# Patient Record
Sex: MALE | Race: WHITE | NOT HISPANIC OR LATINO
[De-identification: names, ages, dates, MRNs, and addresses within clinical notes are randomized per-mention and may not be internally consistent; named-entity substitution may affect disease eponyms.]

---

## 2019-08-21 ENCOUNTER — RECORD ABSTRACTING (OUTPATIENT)
Age: 39
End: 2019-08-21

## 2019-08-21 DIAGNOSIS — Z81.8 FAMILY HISTORY OF OTHER MENTAL AND BEHAVIORAL DISORDERS: ICD-10-CM

## 2019-08-21 RX ORDER — BUPROPION HYDROCHLORIDE 300 MG/1
300 TABLET, EXTENDED RELEASE ORAL
Refills: 0 | Status: ACTIVE | COMMUNITY

## 2020-09-18 ENCOUNTER — TRANSCRIPTION ENCOUNTER (OUTPATIENT)
Age: 40
End: 2020-09-18

## 2020-10-30 ENCOUNTER — NON-APPOINTMENT (OUTPATIENT)
Age: 40
End: 2020-10-30

## 2020-10-30 ENCOUNTER — APPOINTMENT (OUTPATIENT)
Dept: INTERNAL MEDICINE | Facility: CLINIC | Age: 40
End: 2020-10-30
Payer: MEDICAID

## 2020-10-30 VITALS
DIASTOLIC BLOOD PRESSURE: 81 MMHG | HEART RATE: 77 BPM | BODY MASS INDEX: 30.19 KG/M2 | HEIGHT: 68.5 IN | SYSTOLIC BLOOD PRESSURE: 127 MMHG | WEIGHT: 201.5 LBS | TEMPERATURE: 98.3 F | OXYGEN SATURATION: 98 %

## 2020-10-30 DIAGNOSIS — R03.0 ELEVATED BLOOD-PRESSURE READING, W/OUT DIAGNOSIS OF HYPERTENSION: ICD-10-CM

## 2020-10-30 PROCEDURE — 93000 ELECTROCARDIOGRAM COMPLETE: CPT

## 2020-10-30 PROCEDURE — 99204 OFFICE O/P NEW MOD 45 MIN: CPT | Mod: 25

## 2020-10-30 PROCEDURE — 99072 ADDL SUPL MATRL&STAF TM PHE: CPT

## 2020-10-30 RX ORDER — INSULIN GLARGINE 100 [IU]/ML
100 INJECTION, SOLUTION SUBCUTANEOUS TWICE DAILY
Refills: 0 | Status: ACTIVE | COMMUNITY

## 2020-10-30 NOTE — HISTORY OF PRESENT ILLNESS
[FreeTextEntry8] : Had elevated bp at blood donation 2 wks ago--no prior elevations at endo\par IDDM--last hba1c 7.0--endo--[facility]\par sees Dr Kwok--Psychiatrist and therapist\par Home bps ave 130/90 on arm cuff\par denies diabetic end organ damage

## 2020-10-30 NOTE — PHYSICAL EXAM
[Regular Rhythm] : with a regular rhythm [Normal S1, S2] : normal S1 and S2 [Normal] : normal gait, coordination grossly intact, no focal deficits and deep tendon reflexes were 2+ and symmetric [de-identified] : 2/6 syst murmor

## 2020-11-05 ENCOUNTER — APPOINTMENT (OUTPATIENT)
Dept: CARDIOLOGY | Facility: CLINIC | Age: 40
End: 2020-11-05
Payer: MEDICAID

## 2020-11-05 PROCEDURE — 99072 ADDL SUPL MATRL&STAF TM PHE: CPT

## 2020-11-05 PROCEDURE — 93306 TTE W/DOPPLER COMPLETE: CPT

## 2021-02-11 ENCOUNTER — APPOINTMENT (OUTPATIENT)
Dept: ENDOCRINOLOGY | Facility: CLINIC | Age: 41
End: 2021-02-11
Payer: MEDICAID

## 2021-02-11 VITALS
HEART RATE: 65 BPM | DIASTOLIC BLOOD PRESSURE: 84 MMHG | WEIGHT: 186 LBS | TEMPERATURE: 98.5 F | OXYGEN SATURATION: 97 % | BODY MASS INDEX: 27.87 KG/M2 | HEIGHT: 68.5 IN | SYSTOLIC BLOOD PRESSURE: 125 MMHG

## 2021-02-11 DIAGNOSIS — E10.65 TYPE 1 DIABETES MELLITUS WITH HYPERGLYCEMIA: ICD-10-CM

## 2021-02-11 DIAGNOSIS — Z83.438 FAMILY HISTORY OF OTHER DISORDER OF LIPOPROTEIN METABOLISM AND OTHER LIPIDEMIA: ICD-10-CM

## 2021-02-11 PROCEDURE — 99203 OFFICE O/P NEW LOW 30 MIN: CPT

## 2021-02-11 PROCEDURE — 99072 ADDL SUPL MATRL&STAF TM PHE: CPT

## 2021-02-11 RX ORDER — GLUCAGON 3 MG/1
3 POWDER NASAL
Qty: 1 | Refills: 3 | Status: ACTIVE | COMMUNITY
Start: 2021-02-11 | End: 1900-01-01

## 2021-02-11 RX ORDER — INSULIN LISPRO 100 [IU]/ML
INJECTION, SOLUTION INTRAVENOUS; SUBCUTANEOUS
Refills: 0 | Status: COMPLETED | COMMUNITY
End: 2021-02-11

## 2021-02-11 RX ORDER — INSULIN GLULISINE 100 [IU]/ML
100 INJECTION, SOLUTION SUBCUTANEOUS
Qty: 1 | Refills: 5 | Status: ACTIVE | COMMUNITY
Start: 2021-02-11 | End: 1900-01-01

## 2021-02-17 LAB
ALBUMIN SERPL ELPH-MCNC: 5 G/DL
ALP BLD-CCNC: 84 U/L
ALT SERPL-CCNC: 15 U/L
ANION GAP SERPL CALC-SCNC: 11 MMOL/L
AST SERPL-CCNC: 17 U/L
BASOPHILS # BLD AUTO: 0.07 K/UL
BASOPHILS NFR BLD AUTO: 0.9 %
BILIRUB SERPL-MCNC: 0.2 MG/DL
BUN SERPL-MCNC: 9 MG/DL
CALCIUM SERPL-MCNC: 9.5 MG/DL
CHLORIDE SERPL-SCNC: 105 MMOL/L
CHOLEST SERPL-MCNC: 193 MG/DL
CO2 SERPL-SCNC: 26 MMOL/L
CREAT SERPL-MCNC: 0.89 MG/DL
CREAT SPEC-SCNC: 155 MG/DL
EOSINOPHIL # BLD AUTO: 0.03 K/UL
EOSINOPHIL NFR BLD AUTO: 0.4 %
GLUCOSE SERPL-MCNC: 24 MG/DL
HCT VFR BLD CALC: 45.7 %
HDLC SERPL-MCNC: 59 MG/DL
HGB BLD-MCNC: 14.9 G/DL
IMM GRANULOCYTES NFR BLD AUTO: 0.5 %
LDLC SERPL CALC-MCNC: 117 MG/DL
LYMPHOCYTES # BLD AUTO: 2.19 K/UL
LYMPHOCYTES NFR BLD AUTO: 28.1 %
MAN DIFF?: NORMAL
MCHC RBC-ENTMCNC: 29.7 PG
MCHC RBC-ENTMCNC: 32.6 GM/DL
MCV RBC AUTO: 91 FL
MICROALBUMIN 24H UR DL<=1MG/L-MCNC: <1.2 MG/DL
MICROALBUMIN/CREAT 24H UR-RTO: NORMAL MG/G
MONOCYTES # BLD AUTO: 0.58 K/UL
MONOCYTES NFR BLD AUTO: 7.4 %
NEUTROPHILS # BLD AUTO: 4.88 K/UL
NEUTROPHILS NFR BLD AUTO: 62.7 %
NONHDLC SERPL-MCNC: 135 MG/DL
PANC ISLET CELL AB SER QL: NORMAL
PLATELET # BLD AUTO: 297 K/UL
POTASSIUM SERPL-SCNC: 4 MMOL/L
PROT SERPL-MCNC: 7.1 G/DL
RBC # BLD: 5.02 M/UL
RBC # FLD: 13.7 %
SODIUM SERPL-SCNC: 143 MMOL/L
T4 FREE SERPL-MCNC: 1 NG/DL
TRIGL SERPL-MCNC: 88 MG/DL
TSH SERPL-ACNC: 2.99 UIU/ML
TTG IGA SER IA-ACNC: <1.2 U/ML
TTG IGA SER-ACNC: NEGATIVE
WBC # FLD AUTO: 7.79 K/UL

## 2021-02-17 RX ORDER — OMEGA-3-ACID ETHYL ESTERS CAPSULES 1 G/1
1 CAPSULE, LIQUID FILLED ORAL
Qty: 120 | Refills: 3 | Status: ACTIVE | COMMUNITY
Start: 2021-02-17 | End: 1900-01-01

## 2021-02-18 PROBLEM — Z83.438 FAMILY HISTORY OF HYPERLIPIDEMIA: Status: ACTIVE | Noted: 2020-10-30

## 2021-02-18 PROBLEM — E10.65 TYPE 1 DIABETES MELLITUS, UNCONTROLLED: Status: RESOLVED | Noted: 2021-02-18 | Resolved: 2021-02-18

## 2021-02-18 NOTE — PHYSICAL EXAM
[Alert] : alert [No Acute Distress] : no acute distress [Normal Sclera/Conjunctiva] : normal sclera/conjunctiva [No Proptosis] : no proptosis [Normal Outer Ear/Nose] : the ears and nose were normal in appearance [Normal Hearing] : hearing was normal [No Neck Mass] : no neck mass was observed [Thyroid Not Enlarged] : the thyroid was not enlarged [No Respiratory Distress] : no respiratory distress [Clear to Auscultation] : lungs were clear to auscultation bilaterally [Normal S1, S2] : normal S1 and S2 [Normal Rate] : heart rate was normal [Normal Bowel Sounds] : normal bowel sounds [Soft] : abdomen soft [Normal Gait] : normal gait [No Rash] : no rash [Right Foot Was Examined] : right foot ~C was examined [Left Foot Was Examined] : left foot ~C was examined [Normal] : normal [2+] : 2+ in the dorsalis pedis [No Tremors] : no tremors [Oriented x3] : oriented to person, place, and time [Normal Affect] : the affect was normal [Normal Mood] : the mood was normal [Foot Ulcers] : no foot ulcers [Vibration Dec.] : normal vibratory sensation at the level of the toes [Position Sense Dec.] : normal position sense at the level of the toes

## 2021-02-18 NOTE — HISTORY OF PRESENT ILLNESS
[FreeTextEntry1] : 40 yr old M with Type 1 DM and primary hypothyroidism\par \par Age 18 diagnosed with DM1\par Currently takes Lantus 25 at night and 10 in AM\par Has 3 meals a day\par Consumes sugary foods such as candy bars\par Takes Humalog 20U  before a meal\par States he is insulin resistant \par 1 unit Humalog drops BG 10 points\par No Hx of  DKA\par Does not have medical alert bracelet\par He saw optho recently -no retinopathy\par Noted to have frequent hypoglycemia in AM and hyperglycemia at bedtime\par Takes LT4 125mcg daily on empty stomach. No cold/heat intolerance, constipation/diarrhea, palpitations or fatigue.\par \par

## 2021-02-18 NOTE — REVIEW OF SYSTEMS
[All other systems negative] : All other systems negative [Fatigue] : no fatigue [Decreased Appetite] : appetite not decreased [Visual Field Defect] : no visual field defect [Dysphagia] : no dysphagia [Dysphonia] : no dysphonia [Shortness Of Breath] : no shortness of breath [Nausea] : no nausea [Vomiting] : no vomiting [Polyuria] : no polyuria [Hesistancy] : no hesitancy [Joint Pain] : no joint pain [Headaches] : no headaches [Tremors] : no tremors [Cold Intolerance] : no cold intolerance [Heat Intolerance] : no heat intolerance [Easy Bleeding] : no ~M tendency for easy bleeding [Easy Bruising] : no tendency for easy bruising

## 2021-02-18 NOTE — ASSESSMENT
[FreeTextEntry1] : 40 yr old M with Type 1 DM A1C 6.4 and primary hypothyroidism\par 1) DM1:\par Counselled on diet and exercise modification\par Patient is interested in insulin pump -will refer to [facility]\par Recommend decrease in Lantus to 20 Units and increase in premeal to 25 units before dinner\par Will change Humalog to apidra as patient feels a more rapid acting insulin would work better for him\par Recommend medical alert bracelet, prescribed nasal glucagon\par Pt has ketone strips\par Will screen for celiac disease\par 2) HTN\par Check Urine Micro/Creat Ratio\par 3) HLD\par Check Lipid panel\par 4) Hypothyroidism\par Check TFTs\par Cont LT4 125mcg daily for now\par

## 2021-02-24 LAB
GAD65 AB SER-MCNC: 0.02 NMOL/L
ISLET CELL512 AB SER-SCNC: 0 NMOL/L
ZINC TRANSPORTER 8 AB: <15 U/ML

## 2021-03-26 ENCOUNTER — APPOINTMENT (OUTPATIENT)
Dept: ENDOCRINOLOGY | Facility: CLINIC | Age: 41
End: 2021-03-26
Payer: MEDICAID

## 2021-03-26 DIAGNOSIS — E10.9 TYPE 1 DIABETES MELLITUS W/OUT COMPLICATIONS: ICD-10-CM

## 2021-03-26 PROCEDURE — 99072 ADDL SUPL MATRL&STAF TM PHE: CPT

## 2021-03-26 PROCEDURE — G0108 DIAB MANAGE TRN  PER INDIV: CPT

## 2021-04-05 LAB
C PEPTIDE SERPL-MCNC: <0.1 NG/ML
GLUCOSE BS SERPL-MCNC: 99 MG/DL

## 2021-04-23 ENCOUNTER — APPOINTMENT (OUTPATIENT)
Dept: PULMONOLOGY | Facility: CLINIC | Age: 41
End: 2021-04-23
Payer: MEDICAID

## 2021-04-23 VITALS
OXYGEN SATURATION: 96 % | SYSTOLIC BLOOD PRESSURE: 125 MMHG | HEART RATE: 93 BPM | DIASTOLIC BLOOD PRESSURE: 88 MMHG | TEMPERATURE: 98 F | WEIGHT: 194 LBS | BODY MASS INDEX: 29.07 KG/M2

## 2021-04-23 PROCEDURE — 99203 OFFICE O/P NEW LOW 30 MIN: CPT

## 2021-04-23 PROCEDURE — 99072 ADDL SUPL MATRL&STAF TM PHE: CPT

## 2021-04-23 NOTE — HISTORY OF PRESENT ILLNESS
[Never] : never [TextBox_4] : Mr. PATRICIA SAM is a 40 year old  with DMI and hypothyroidism here for evaluation. \par \par Patient will be starting Afrezza  (inhaled insulin) and here for pulmonary evaluation. He denies hx of pulmonary issues in the past. Denies cough, wheezing, SOB. Never been on any inhalers. \par \par PMH: DM1 (on insuline); hypothyroid\par Meds: per chart\par All: NKDA\par SH: , never smoker, no known exposures\par FH: father - smoker; hx of emphysema\par PMD: Dr Freitas\par Immunizations: scheduled for covid vaccine on monday\par

## 2021-04-23 NOTE — CONSULT LETTER
[Dear  ___] : Dear  [unfilled], [Consult Letter:] : I had the pleasure of evaluating your patient, [unfilled]. [Please see my note below.] : Please see my note below. [Consult Closing:] : Thank you very much for allowing me to participate in the care of this patient.  If you have any questions, please do not hesitate to contact me. [FreeTextEntry3] : Sincerely,\par \par Jennie Morgan MD\par [facility] Physician [facility]\par Pulmonary Medicine\par tel: [phone]\par fax: [phone]\par

## 2021-04-23 NOTE — ASSESSMENT
[FreeTextEntry1] : Mr. PATRICIA SAM is a 40 year old  with DMI and hypothyroidism here for evaluation prior to initiation of inhaled insulin (Afrezza). Patient denies any known pulmonary issues, including hx of asthma, bronchitis, frequent PNAs. His is saturating well on RA and lungs are clear on exam\par -- will obtain screening PFTs prior to initiation of Afrezza, then after 6 months and annually thereafter per guidelines\par \par All questions answered. Patient in agreement with plan. \par I will call patient after PFTs\par

## 2021-05-06 ENCOUNTER — APPOINTMENT (OUTPATIENT)
Dept: ENDOCRINOLOGY | Facility: CLINIC | Age: 41
End: 2021-05-06

## 2021-05-28 RX ORDER — INSULIN LISPRO 100 [IU]/ML
100 INJECTION, SOLUTION INTRAVENOUS; SUBCUTANEOUS
Qty: 5 | Refills: 1 | Status: ACTIVE | COMMUNITY
Start: 2021-05-28 | End: 1900-01-01

## 2021-06-01 ENCOUNTER — APPOINTMENT (OUTPATIENT)
Dept: DISASTER EMERGENCY | Facility: CLINIC | Age: 41
End: 2021-06-01

## 2021-06-04 ENCOUNTER — APPOINTMENT (OUTPATIENT)
Dept: PULMONOLOGY | Facility: CLINIC | Age: 41
End: 2021-06-04

## 2021-08-26 ENCOUNTER — RX RENEWAL (OUTPATIENT)
Age: 41
End: 2021-08-26

## 2021-08-26 RX ORDER — LEVOTHYROXINE SODIUM 0.12 MG/1
125 TABLET ORAL DAILY
Qty: 30 | Refills: 3 | Status: ACTIVE | COMMUNITY
Start: 2021-08-26 | End: 1900-01-01

## 2022-03-30 ENCOUNTER — APPOINTMENT (OUTPATIENT)
Dept: INTERNAL MEDICINE | Facility: CLINIC | Age: 42
End: 2022-03-30
Payer: MEDICAID

## 2022-03-30 ENCOUNTER — NON-APPOINTMENT (OUTPATIENT)
Age: 42
End: 2022-03-30

## 2022-03-30 VITALS
BODY MASS INDEX: 30.79 KG/M2 | WEIGHT: 203.13 LBS | OXYGEN SATURATION: 97 % | DIASTOLIC BLOOD PRESSURE: 75 MMHG | SYSTOLIC BLOOD PRESSURE: 115 MMHG | TEMPERATURE: 98.3 F | HEIGHT: 68 IN | HEART RATE: 76 BPM

## 2022-03-30 DIAGNOSIS — E03.9 HYPOTHYROIDISM, UNSPECIFIED: ICD-10-CM

## 2022-03-30 DIAGNOSIS — Z00.00 ENCOUNTER FOR GENERAL ADULT MEDICAL EXAMINATION W/OUT ABNORMAL FINDINGS: ICD-10-CM

## 2022-03-30 DIAGNOSIS — I10 ESSENTIAL (PRIMARY) HYPERTENSION: ICD-10-CM

## 2022-03-30 DIAGNOSIS — E78.5 HYPERLIPIDEMIA, UNSPECIFIED: ICD-10-CM

## 2022-03-30 DIAGNOSIS — F32.A DEPRESSION, UNSPECIFIED: ICD-10-CM

## 2022-03-30 PROCEDURE — 93000 ELECTROCARDIOGRAM COMPLETE: CPT

## 2022-03-30 PROCEDURE — 99395 PREV VISIT EST AGE 18-39: CPT | Mod: 25

## 2022-03-30 NOTE — HISTORY OF PRESENT ILLNESS
[FreeTextEntry1] : cpx [de-identified] : cpx\par reviewed spec. notes\par on insulin pump per endo ([facility])\par covid vacced\par last hba1c 5.8--reviewed labs from 12/21\par UTD with optho\par home bps in 130 range\par sees mental health

## 2022-03-31 ENCOUNTER — NON-APPOINTMENT (OUTPATIENT)
Age: 42
End: 2022-03-31

## 2022-06-10 LAB — HBA1C MFR BLD HPLC: 5.8

## 2025-04-15 ENCOUNTER — NON-APPOINTMENT (OUTPATIENT)
Age: 45
End: 2025-04-15

## 2025-04-15 ENCOUNTER — APPOINTMENT (OUTPATIENT)
Dept: INTERNAL MEDICINE | Facility: CLINIC | Age: 45
End: 2025-04-15
Payer: COMMERCIAL

## 2025-04-15 VITALS
OXYGEN SATURATION: 98 % | BODY MASS INDEX: 26.76 KG/M2 | DIASTOLIC BLOOD PRESSURE: 61 MMHG | SYSTOLIC BLOOD PRESSURE: 100 MMHG | HEART RATE: 64 BPM | TEMPERATURE: 97.8 F | RESPIRATION RATE: 16 BRPM | WEIGHT: 176 LBS

## 2025-04-15 DIAGNOSIS — R53.83 OTHER MALAISE: ICD-10-CM

## 2025-04-15 DIAGNOSIS — E10.9 TYPE 1 DIABETES MELLITUS W/OUT COMPLICATIONS: ICD-10-CM

## 2025-04-15 DIAGNOSIS — E03.9 HYPOTHYROIDISM, UNSPECIFIED: ICD-10-CM

## 2025-04-15 DIAGNOSIS — R53.81 OTHER MALAISE: ICD-10-CM

## 2025-04-15 PROCEDURE — G2211 COMPLEX E/M VISIT ADD ON: CPT

## 2025-04-15 PROCEDURE — 99203 OFFICE O/P NEW LOW 30 MIN: CPT
